# Patient Record
Sex: FEMALE | Race: BLACK OR AFRICAN AMERICAN | NOT HISPANIC OR LATINO | URBAN - METROPOLITAN AREA
[De-identification: names, ages, dates, MRNs, and addresses within clinical notes are randomized per-mention and may not be internally consistent; named-entity substitution may affect disease eponyms.]

---

## 2017-03-22 ENCOUNTER — INPATIENT (INPATIENT)
Facility: HOSPITAL | Age: 22
LOS: 1 days | Discharge: HOME | End: 2017-03-24
Attending: INTERNAL MEDICINE | Admitting: INTERNAL MEDICINE
Payer: COMMERCIAL

## 2017-03-22 VITALS
HEART RATE: 72 BPM | DIASTOLIC BLOOD PRESSURE: 72 MMHG | RESPIRATION RATE: 16 BRPM | SYSTOLIC BLOOD PRESSURE: 152 MMHG | TEMPERATURE: 98 F | OXYGEN SATURATION: 100 %

## 2017-03-22 LAB
ALBUMIN SERPL ELPH-MCNC: 3.8 G/DL — SIGNIFICANT CHANGE UP (ref 3.3–5)
ALP SERPL-CCNC: 57 U/L — SIGNIFICANT CHANGE UP (ref 40–120)
ALT FLD-CCNC: 11 U/L — SIGNIFICANT CHANGE UP (ref 4–33)
AST SERPL-CCNC: 13 U/L — SIGNIFICANT CHANGE UP (ref 4–32)
BASOPHILS # BLD AUTO: 0.02 K/UL — SIGNIFICANT CHANGE UP (ref 0–0.2)
BASOPHILS NFR BLD AUTO: 0.3 % — SIGNIFICANT CHANGE UP (ref 0–2)
BILIRUB SERPL-MCNC: 0.5 MG/DL — SIGNIFICANT CHANGE UP (ref 0.2–1.2)
BUN SERPL-MCNC: 8 MG/DL — SIGNIFICANT CHANGE UP (ref 7–23)
C4 SERPL-MCNC: 23.1 MG/DL — SIGNIFICANT CHANGE UP (ref 10–40)
CALCIUM SERPL-MCNC: 8.9 MG/DL — SIGNIFICANT CHANGE UP (ref 8.4–10.5)
CHLORIDE SERPL-SCNC: 109 MMOL/L — HIGH (ref 98–107)
CO2 SERPL-SCNC: 20 MMOL/L — LOW (ref 22–31)
CREAT SERPL-MCNC: 0.62 MG/DL — SIGNIFICANT CHANGE UP (ref 0.5–1.3)
CRP SERPL-MCNC: 11.5 MG/L — HIGH (ref 0.3–5)
EOSINOPHIL # BLD AUTO: 0.01 K/UL — SIGNIFICANT CHANGE UP (ref 0–0.5)
EOSINOPHIL NFR BLD AUTO: 0.2 % — SIGNIFICANT CHANGE UP (ref 0–6)
ERYTHROCYTE [SEDIMENTATION RATE] IN BLOOD: 26 MM/HR — HIGH (ref 4–25)
GLUCOSE SERPL-MCNC: 87 MG/DL — SIGNIFICANT CHANGE UP (ref 70–99)
HBA1C BLD-MCNC: 5.8 % — HIGH (ref 4–5.6)
HCG SERPL-ACNC: < 5 MIU/ML — SIGNIFICANT CHANGE UP
HCT VFR BLD CALC: 34.3 % — LOW (ref 34.5–45)
HGB BLD-MCNC: 11.6 G/DL — SIGNIFICANT CHANGE UP (ref 11.5–15.5)
IMM GRANULOCYTES NFR BLD AUTO: 0 % — SIGNIFICANT CHANGE UP (ref 0–1.5)
LYMPHOCYTES # BLD AUTO: 0.71 K/UL — LOW (ref 1–3.3)
LYMPHOCYTES # BLD AUTO: 11.6 % — LOW (ref 13–44)
MCHC RBC-ENTMCNC: 28 PG — SIGNIFICANT CHANGE UP (ref 27–34)
MCHC RBC-ENTMCNC: 33.8 % — SIGNIFICANT CHANGE UP (ref 32–36)
MCV RBC AUTO: 82.7 FL — SIGNIFICANT CHANGE UP (ref 80–100)
MONOCYTES # BLD AUTO: 0.07 K/UL — SIGNIFICANT CHANGE UP (ref 0–0.9)
MONOCYTES NFR BLD AUTO: 1.1 % — LOW (ref 2–14)
NEUTROPHILS # BLD AUTO: 5.3 K/UL — SIGNIFICANT CHANGE UP (ref 1.8–7.4)
NEUTROPHILS NFR BLD AUTO: 86.8 % — HIGH (ref 43–77)
PLATELET # BLD AUTO: 230 K/UL — SIGNIFICANT CHANGE UP (ref 150–400)
PMV BLD: 10.8 FL — SIGNIFICANT CHANGE UP (ref 7–13)
POTASSIUM SERPL-MCNC: 3.8 MMOL/L — SIGNIFICANT CHANGE UP (ref 3.5–5.3)
POTASSIUM SERPL-SCNC: 3.8 MMOL/L — SIGNIFICANT CHANGE UP (ref 3.5–5.3)
PROT SERPL-MCNC: 7.3 G/DL — SIGNIFICANT CHANGE UP (ref 6–8.3)
RBC # BLD: 4.15 M/UL — SIGNIFICANT CHANGE UP (ref 3.8–5.2)
RBC # FLD: 13.9 % — SIGNIFICANT CHANGE UP (ref 10.3–14.5)
SODIUM SERPL-SCNC: 144 MMOL/L — SIGNIFICANT CHANGE UP (ref 135–145)
WBC # BLD: 6.11 K/UL — SIGNIFICANT CHANGE UP (ref 3.8–10.5)
WBC # FLD AUTO: 6.11 K/UL — SIGNIFICANT CHANGE UP (ref 3.8–10.5)

## 2017-03-22 RX ORDER — FAMOTIDINE 10 MG/ML
20 INJECTION INTRAVENOUS ONCE
Qty: 0 | Refills: 0 | Status: COMPLETED | OUTPATIENT
Start: 2017-03-22 | End: 2017-03-22

## 2017-03-22 RX ORDER — DIPHENHYDRAMINE HCL 50 MG
25 CAPSULE ORAL EVERY 6 HOURS
Qty: 0 | Refills: 0 | Status: DISCONTINUED | OUTPATIENT
Start: 2017-03-22 | End: 2017-03-23

## 2017-03-22 RX ORDER — SODIUM CHLORIDE 9 MG/ML
1000 INJECTION INTRAMUSCULAR; INTRAVENOUS; SUBCUTANEOUS ONCE
Qty: 0 | Refills: 0 | Status: COMPLETED | OUTPATIENT
Start: 2017-03-22 | End: 2017-03-22

## 2017-03-22 RX ORDER — DIPHENHYDRAMINE HCL 50 MG
25 CAPSULE ORAL ONCE
Qty: 0 | Refills: 0 | Status: COMPLETED | OUTPATIENT
Start: 2017-03-22 | End: 2017-03-22

## 2017-03-22 RX ORDER — DEXAMETHASONE 0.5 MG/5ML
10 ELIXIR ORAL ONCE
Qty: 0 | Refills: 0 | Status: COMPLETED | OUTPATIENT
Start: 2017-03-22 | End: 2017-03-22

## 2017-03-22 RX ORDER — FAMOTIDINE 10 MG/ML
20 INJECTION INTRAVENOUS
Qty: 0 | Refills: 0 | Status: DISCONTINUED | OUTPATIENT
Start: 2017-03-22 | End: 2017-03-24

## 2017-03-22 RX ORDER — SODIUM CHLORIDE 9 MG/ML
1000 INJECTION INTRAMUSCULAR; INTRAVENOUS; SUBCUTANEOUS
Qty: 0 | Refills: 0 | Status: DISCONTINUED | OUTPATIENT
Start: 2017-03-22 | End: 2017-03-24

## 2017-03-22 RX ADMIN — FAMOTIDINE 20 MILLIGRAM(S): 10 INJECTION INTRAVENOUS at 15:53

## 2017-03-22 RX ADMIN — Medication 25 MILLIGRAM(S): at 15:53

## 2017-03-22 RX ADMIN — Medication 125 MILLIGRAM(S): at 15:53

## 2017-03-22 RX ADMIN — SODIUM CHLORIDE 1000 MILLILITER(S): 9 INJECTION INTRAMUSCULAR; INTRAVENOUS; SUBCUTANEOUS at 17:37

## 2017-03-22 NOTE — ED PROVIDER NOTE - ATTENDING CONTRIBUTION TO CARE
DR. HARDWICK, ATTENDING MD-  I performed a face to face bedside interview with patient regarding history of present illness, review of symptoms and past medical history. I completed an independent physical exam.  I have discussed patient's plan of care with the resident.   Documentation as above in the note.    my exam: nad.  tongue swollen but not elevated and I am able to visualize posterior oropharynx and pt able to phonate well.  no stridor DR. HARDWICK, ATTENDING MD-  I performed a face to face bedside interview with patient regarding history of present illness, review of symptoms and past medical history. I completed an independent physical exam.  I have discussed patient's plan of care with the resident.   Documentation as above in the note..    my exam: nad.  tongue swollen but not elevated and I am able to visualize posterior oropharynx and pt able to phonate well.  no stridor DR. HARDWICK, ATTENDING MD-  I performed a face to face bedside interview with patient regarding history of present illness, review of symptoms and past medical history. I completed an independent physical exam.  I have discussed patient's plan of care with the resident.   Documentation as above in the note...    my exam: nad.  tongue swollen but not elevated and I am able to visualize posterior oropharynx and pt able to phonate well.  no stridor

## 2017-03-22 NOTE — ED PROVIDER NOTE - PROGRESS NOTE DETAILS
tay: I evaluated the pt several times since 4pm, and there is no improvement in the tongue swelling. ot has diff swallowing. She is not in any respiratory distress. rr 14-16 and nonstridorous.   as there has not been any improvement, will request cdu consult and transfer for further observation and care. tay: at request of CDU provider, before acceptance, ENT consult requested. tay: at request of CDU provider, before acceptance, ENT consult requested..

## 2017-03-22 NOTE — ED CDU PROVIDER NOTE - DETAILS
On CDU arrival, examination under tongue reveals piercing that, when gently manipulated, pus easily flowed from left side of piercing (piercing is curved bar with balls that screw on and off on end).  IV antibiotics ordered; ENT Dr. Stroud made aware.

## 2017-03-22 NOTE — ED CDU PROVIDER NOTE - OBJECTIVE STATEMENT
Pt is a 20 yo F, history of carrying trait for PKD (no active disease per Pt), who presents to the ED from urgent care with CC of tongue swelling and soreness. Onset was yesterday, worse today, and Pt is noting difficulty with speaking and swallowing b/c of salivation. She notes some soreness in the tongue, but denies other pain. She denies difficulty breathing. She has no previous history of tongue swelling or similar isolated swelling - and no FHx of angioedema. She is allergic to PCNs, but has not taken any antibiotics recently. No new medications (only med is Depo Provera). She has a tongue piercing (at the base of the tongue) but has had that for years. Denies new foods or exposures. She was treated recently for idiopathic colitis, since resolved, but she has been avoiding nuts and dairy.    CDU WILMA Alejandro Note-----  ED Provider Note reviewed per above; pt is a 20 yo female with PMH of polycystic kidney, who presented to the ED for tongue swelling as noted above.  Pt. was evaluated in the ED and managed for possible allergic reaction; ENT was consulted and pt was scoped; no laryngeal edema found.  Pt. was managed with IV hydration, Benadryl, Pepcid, and sent to CDU for plan to continue meds per orders, IV hydration, and for observation and reassessment.  On CDU arrival, pt stated she felt slight improvement of swelling vs initial ED arrival.  On examination, area under tongue reveals piercing that, when gently manipulated, pus easily flowed from left side of piercing (piercing is curved bar with balls that screw on and off on end).  CT had been ordered to r/o infection, on CDU arrival, pt is pending CT for further evaluation, IV antibiotics ordered; piercing removed (see Procedure Note); ENT Dr. Stroud made aware.

## 2017-03-22 NOTE — ED CDU PROVIDER NOTE - MOUTH
Generalized mild tongue inflammation noted at time of CDU arrival.  Piercing (curved metal bar with metal balls at either end) noted in place in midline under tongue at base.  Manipulation (gentle manual manipulation) of piercing causes pus to easily flow from left side of piercing.  Remainder of oral exam WNL; no uvular edema noted on CDU exam; no oral lesions noted.  Oral structures are symmetrical in appearance.

## 2017-03-22 NOTE — ED ADULT NURSE NOTE - CHIEF COMPLAINT QUOTE
sent by [facility] for tongue swelling, patient states symptoms started yesterday at 12:00 p.m., and progressively worsening, (+) difficulty swallowing saliva.  Difficulty closing mouth completely.  No drooling noted.  Denies any throat discomfort.  Patient has (+) piercing below tongue x 5 years.

## 2017-03-22 NOTE — ED PROVIDER NOTE - OBJECTIVE STATEMENT
Pt is a 22 yo F, history of carrying trait for PKD (no active disease per Pt), who presents to the ED from urgent care with CC of tongue swelling and soreness. Onset was yesterday, worse today, and Pt is noting difficulty with speaking and swallowing b/c of salivation. She notes some soreness in the tongue, but denies other pain. She denies difficulty breathing. She has no previous history of tongue swelling or similar isolated swelling - and no FHx of angioedema. She is allergic to PCNs, but has not taken any antibiotics recently. No new medications (only med is Depo Provera). She has a tongue piercing (at the base of the tongue) but has had that for years. Denies new foods or exposures. She was treated recently for idiopathic colitis, since resolved, but she has been avoiding nuts and dairy.

## 2017-03-22 NOTE — ED CDU PROVIDER NOTE - PROGRESS NOTE DETAILS
CDU WILMA Alejandro Addendum-----  CT results reviewed and discussed with ENT resident.  Pt. verbalizing feeling some improvement in swelling, antibiotics were started and CT official report states no fluid collection.  Pt. objectively appears very comfortable; VSS.  Will monitor for continued improvement.  ENT resident states service will re-eval pt in morning.  Pt resting comfortably at present, O2 sats % on room air.  Will continue to monitor / reassess. WILMA Serna: pt feels improved however ENT would like pt admitted for continued IV Abx. Will contact PMD/hospitalist for admission. WILMA Serna: Spoke with PMD Dr. Ziegler  does not admit to hospital. Will admit to hospitalist. Sourav: Supervising WILMA Serna 3/21/17. Sourav (Physician) CDU ATTENDING D/C NOTE for admission to hospital: Young woman, swollen tongue, removal of tongue ring led to draining pus. CT showing no drainable collection. Scoped by ENT, who recommend IP ABx. Appears well. AFVSS. No stridor. Tongue slightly large. Will admit for IV Abx.

## 2017-03-22 NOTE — ED PROVIDER NOTE - MEDICAL DECISION MAKING DETAILS
Pt is a 22 yo F who presents with isolated tongue swelling, onset yesterday, worsening today. No known new exposures/food/medication. No known trauma, and evaluated the piercing, at base of tongue, which shows no local erythema or drainage. Concern for angioedema (consider also that recent idiopathic colitis could be related), no FHx of angioedema. Given steroids, diphenhydramine and pepcid IV. No additional cutaneous findings on exam. Pt takes Depo, but no estrogens, no ACE-I.  Will continue to monitor closely given proximity to airway though, at this time, no stridor/hoarseness and Pt breathing comfortably.

## 2017-03-22 NOTE — ED CDU PROVIDER NOTE - ATTENDING CONTRIBUTION TO CARE
attest I performed a face-to-face evaluation of the patient and performed a history and physical examination. I agree with the history and physical examination.    Young woman, swollen tongue, removal of tongue ring led to draining pus. CT showing no drainable collection. Scoped by ENT, who recommend IP ABx. Appears well. AFVSS. No stridor. Tongue slightly large. Will admit for IV Abx for tongue abscess/cellulitis. I performed a face-to-face evaluation of the patient and performed a history and physical examination. I agree with the history and physical examination.    Young woman, swollen tongue, removal of tongue ring led to draining pus. CT showing no drainable collection. Scoped by ENT, who recommend IP ABx. Appears well. AFVSS. No stridor. Tongue slightly large. Will admit for IV Abx for tongue abscess/cellulitis..

## 2017-03-22 NOTE — ED CDU PROVIDER NOTE - CONSTITUTIONAL, MLM
normal... Well appearing, well nourished, awake, alert, oriented to person, place, time/situation and in no apparent distress.  Pt. has mild generalized tongue inflammation noted; pt is able to verbalize effortlessly in full, clear sentences.  No dyspnea or stridor noted; pt objectively appears very comfortable.

## 2017-03-22 NOTE — ED ADULT TRIAGE NOTE - CHIEF COMPLAINT QUOTE
sent by [facility] for tongue swelling, patient states symptoms started yesterday at 12:00 p.m., and progressively worsening, (+) difficulty swallowing saliva.  Difficulty closing mouth completely.  No drooling noted.  Denies any throat discomfort.  Patient has (+) piercing below tongue x 5 years.

## 2017-03-22 NOTE — ED PROVIDER NOTE - SHIFT CHANGE DETAILS
given steroids/benadryl/pepcid.  Will be observed, if no significant improvement may need overnight CDU observation.

## 2017-03-22 NOTE — ED ADULT NURSE NOTE - OBJECTIVE STATEMENT
20 y/o female presents to ED with c/o tongue swelling.  Pt states that the swelling started yesterday, pt denies eating or drinking anything different than usual, states that her tongue just started to feel funny when she was talking.  Pt states that she was able to sleep and awoke this morning with some worsening of the swelling and states that her tongue felt irritated.  Pt denies fever or chills, denies cough or sore throat, pt able to speak denies any difficulty breathing.  Pt states that she has a piercing under her tongue that she has had for 5 years.  Pt offers no other complaints

## 2017-03-22 NOTE — ED PROVIDER NOTE - THROAT FINDINGS
NO DROOLING/HOARSE/MUFFLED VOICE/NO STRIDOR/swollen tongue, some geography (conformed to dentition at edges)

## 2017-03-23 DIAGNOSIS — K14.0 GLOSSITIS: ICD-10-CM

## 2017-03-23 DIAGNOSIS — N63 UNSPECIFIED LUMP IN BREAST: Chronic | ICD-10-CM

## 2017-03-23 PROCEDURE — 99223 1ST HOSP IP/OBS HIGH 75: CPT

## 2017-03-23 PROCEDURE — 70491 CT SOFT TISSUE NECK W/DYE: CPT | Mod: 26

## 2017-03-23 RX ORDER — MEDROXYPROGESTERONE ACETATE 150 MG/ML
0 INJECTION, SUSPENSION, EXTENDED RELEASE INTRAMUSCULAR
Qty: 0 | Refills: 0 | COMMUNITY

## 2017-03-23 RX ORDER — DEXAMETHASONE 0.5 MG/5ML
10 ELIXIR ORAL
Qty: 0 | Refills: 0 | Status: COMPLETED | OUTPATIENT
Start: 2017-03-23 | End: 2017-03-23

## 2017-03-23 RX ORDER — INFLUENZA VIRUS VACCINE 15; 15; 15; 15 UG/.5ML; UG/.5ML; UG/.5ML; UG/.5ML
0.5 SUSPENSION INTRAMUSCULAR ONCE
Qty: 0 | Refills: 0 | Status: DISCONTINUED | OUTPATIENT
Start: 2017-03-23 | End: 2017-03-24

## 2017-03-23 RX ORDER — ACETAMINOPHEN 500 MG
650 TABLET ORAL EVERY 6 HOURS
Qty: 0 | Refills: 0 | Status: DISCONTINUED | OUTPATIENT
Start: 2017-03-23 | End: 2017-03-24

## 2017-03-23 RX ORDER — DIPHENHYDRAMINE HCL 50 MG
50 CAPSULE ORAL
Qty: 0 | Refills: 0 | Status: DISCONTINUED | OUTPATIENT
Start: 2017-03-23 | End: 2017-03-24

## 2017-03-23 RX ORDER — KETOROLAC TROMETHAMINE 30 MG/ML
30 SYRINGE (ML) INJECTION ONCE
Qty: 0 | Refills: 0 | Status: DISCONTINUED | OUTPATIENT
Start: 2017-03-23 | End: 2017-03-23

## 2017-03-23 RX ADMIN — Medication 30 MILLIGRAM(S): at 00:45

## 2017-03-23 RX ADMIN — Medication 25 MILLIGRAM(S): at 00:32

## 2017-03-23 RX ADMIN — Medication 25 MILLIGRAM(S): at 12:17

## 2017-03-23 RX ADMIN — SODIUM CHLORIDE 150 MILLILITER(S): 9 INJECTION INTRAMUSCULAR; INTRAVENOUS; SUBCUTANEOUS at 05:46

## 2017-03-23 RX ADMIN — FAMOTIDINE 20 MILLIGRAM(S): 10 INJECTION INTRAVENOUS at 05:47

## 2017-03-23 RX ADMIN — Medication 100 MILLIGRAM(S): at 02:04

## 2017-03-23 RX ADMIN — FAMOTIDINE 20 MILLIGRAM(S): 10 INJECTION INTRAVENOUS at 17:46

## 2017-03-23 RX ADMIN — Medication 100 MILLIGRAM(S): at 17:46

## 2017-03-23 RX ADMIN — SODIUM CHLORIDE 150 MILLILITER(S): 9 INJECTION INTRAMUSCULAR; INTRAVENOUS; SUBCUTANEOUS at 21:12

## 2017-03-23 RX ADMIN — Medication 100 MILLIGRAM(S): at 10:26

## 2017-03-23 RX ADMIN — Medication 100 MILLIGRAM(S): at 21:12

## 2017-03-23 RX ADMIN — Medication 30 MILLIGRAM(S): at 00:29

## 2017-03-23 RX ADMIN — SODIUM CHLORIDE 150 MILLILITER(S): 9 INJECTION INTRAMUSCULAR; INTRAVENOUS; SUBCUTANEOUS at 00:29

## 2017-03-23 RX ADMIN — Medication 102 MILLIGRAM(S): at 09:40

## 2017-03-23 RX ADMIN — SODIUM CHLORIDE 150 MILLILITER(S): 9 INJECTION INTRAMUSCULAR; INTRAVENOUS; SUBCUTANEOUS at 13:57

## 2017-03-23 RX ADMIN — Medication 25 MILLIGRAM(S): at 05:46

## 2017-03-23 RX ADMIN — Medication 50 MILLIGRAM(S): at 17:46

## 2017-03-23 RX ADMIN — Medication 102 MILLIGRAM(S): at 17:46

## 2017-03-23 RX ADMIN — Medication 102 MILLIGRAM(S): at 01:24

## 2017-03-23 NOTE — H&P ADULT. - HISTORY OF PRESENT ILLNESS
22 yo F w/ h/o PCKD trait (no active disease) presents with 2 days of tongue swelling and soreness. Pt states that 2 days ago she began feeling a funny sensation in her tongue, and decided to go to urgent care yesterday. She was told to come to the ED when she arrived at urgent care. Pt denies any SOB. No previous h/o tongue swelling. No new foods or exposures. No FH of angioedema. Pt has a tongue ring which was removed in the CDU, and noted to have surrounding pus. Pt was scoped by ENT who recommended admission for IV abx.     In the ED, afebrile. VSS.  Pt started on Clindamycin IV, decadron, benadryl, and pepcid.  Labs unremarkable.  CT neck - diffuse swelling of tongue

## 2017-03-24 ENCOUNTER — TRANSCRIPTION ENCOUNTER (OUTPATIENT)
Age: 22
End: 2017-03-24

## 2017-03-24 VITALS
HEART RATE: 80 BPM | TEMPERATURE: 98 F | OXYGEN SATURATION: 99 % | DIASTOLIC BLOOD PRESSURE: 71 MMHG | SYSTOLIC BLOOD PRESSURE: 123 MMHG | RESPIRATION RATE: 18 BRPM

## 2017-03-24 LAB
BASOPHILS # BLD AUTO: 0 K/UL — SIGNIFICANT CHANGE UP (ref 0–0.2)
BASOPHILS NFR BLD AUTO: 0 % — SIGNIFICANT CHANGE UP (ref 0–2)
BUN SERPL-MCNC: 11 MG/DL — SIGNIFICANT CHANGE UP (ref 7–23)
C TRACH RRNA SPEC QL NAA+PROBE: SIGNIFICANT CHANGE UP
CALCIUM SERPL-MCNC: 8.3 MG/DL — LOW (ref 8.4–10.5)
CHLORIDE SERPL-SCNC: 110 MMOL/L — HIGH (ref 98–107)
CO2 SERPL-SCNC: 19 MMOL/L — LOW (ref 22–31)
CREAT SERPL-MCNC: 0.51 MG/DL — SIGNIFICANT CHANGE UP (ref 0.5–1.3)
EOSINOPHIL # BLD AUTO: 0 K/UL — SIGNIFICANT CHANGE UP (ref 0–0.5)
EOSINOPHIL NFR BLD AUTO: 0 % — SIGNIFICANT CHANGE UP (ref 0–6)
GLUCOSE SERPL-MCNC: 122 MG/DL — HIGH (ref 70–99)
HCT VFR BLD CALC: 29.7 % — LOW (ref 34.5–45)
HGB BLD-MCNC: 9.9 G/DL — LOW (ref 11.5–15.5)
HIV1 AG SER QL: SIGNIFICANT CHANGE UP
HIV1+2 AB SPEC QL: SIGNIFICANT CHANGE UP
IMM GRANULOCYTES NFR BLD AUTO: 0.1 % — SIGNIFICANT CHANGE UP (ref 0–1.5)
LYMPHOCYTES # BLD AUTO: 1.27 K/UL — SIGNIFICANT CHANGE UP (ref 1–3.3)
LYMPHOCYTES # BLD AUTO: 16.4 % — SIGNIFICANT CHANGE UP (ref 13–44)
MAGNESIUM SERPL-MCNC: 2.1 MG/DL — SIGNIFICANT CHANGE UP (ref 1.6–2.6)
MCHC RBC-ENTMCNC: 27.3 PG — SIGNIFICANT CHANGE UP (ref 27–34)
MCHC RBC-ENTMCNC: 33.3 % — SIGNIFICANT CHANGE UP (ref 32–36)
MCV RBC AUTO: 81.8 FL — SIGNIFICANT CHANGE UP (ref 80–100)
MONOCYTES # BLD AUTO: 0.63 K/UL — SIGNIFICANT CHANGE UP (ref 0–0.9)
MONOCYTES NFR BLD AUTO: 8.1 % — SIGNIFICANT CHANGE UP (ref 2–14)
N GONORRHOEA RRNA SPEC QL NAA+PROBE: SIGNIFICANT CHANGE UP
NEUTROPHILS # BLD AUTO: 5.83 K/UL — SIGNIFICANT CHANGE UP (ref 1.8–7.4)
NEUTROPHILS NFR BLD AUTO: 75.4 % — SIGNIFICANT CHANGE UP (ref 43–77)
PHOSPHATE SERPL-MCNC: 3.1 MG/DL — SIGNIFICANT CHANGE UP (ref 2.5–4.5)
PLATELET # BLD AUTO: 250 K/UL — SIGNIFICANT CHANGE UP (ref 150–400)
PMV BLD: 11.3 FL — SIGNIFICANT CHANGE UP (ref 7–13)
POTASSIUM SERPL-MCNC: 3.8 MMOL/L — SIGNIFICANT CHANGE UP (ref 3.5–5.3)
POTASSIUM SERPL-SCNC: 3.8 MMOL/L — SIGNIFICANT CHANGE UP (ref 3.5–5.3)
RBC # BLD: 3.63 M/UL — LOW (ref 3.8–5.2)
RBC # FLD: 14.4 % — SIGNIFICANT CHANGE UP (ref 10.3–14.5)
SODIUM SERPL-SCNC: 141 MMOL/L — SIGNIFICANT CHANGE UP (ref 135–145)
SPECIMEN SOURCE: SIGNIFICANT CHANGE UP
T PALLIDUM AB TITR SER: NEGATIVE — SIGNIFICANT CHANGE UP
WBC # BLD: 7.74 K/UL — SIGNIFICANT CHANGE UP (ref 3.8–10.5)
WBC # FLD AUTO: 7.74 K/UL — SIGNIFICANT CHANGE UP (ref 3.8–10.5)

## 2017-03-24 RX ORDER — DIPHENHYDRAMINE HCL 50 MG
1 CAPSULE ORAL
Qty: 0 | Refills: 0 | COMMUNITY
Start: 2017-03-24

## 2017-03-24 RX ORDER — ACETAMINOPHEN 500 MG
2 TABLET ORAL
Qty: 0 | Refills: 0 | COMMUNITY
Start: 2017-03-24

## 2017-03-24 RX ORDER — LACTOBACILLUS ACIDOPHILUS 100MM CELL
1 CAPSULE ORAL
Qty: 0 | Refills: 0 | COMMUNITY

## 2017-03-24 RX ADMIN — Medication 100 MILLIGRAM(S): at 05:02

## 2017-03-24 RX ADMIN — SODIUM CHLORIDE 150 MILLILITER(S): 9 INJECTION INTRAMUSCULAR; INTRAVENOUS; SUBCUTANEOUS at 05:02

## 2017-03-24 RX ADMIN — FAMOTIDINE 20 MILLIGRAM(S): 10 INJECTION INTRAVENOUS at 17:14

## 2017-03-24 RX ADMIN — Medication 100 MILLIGRAM(S): at 13:15

## 2017-03-24 RX ADMIN — FAMOTIDINE 20 MILLIGRAM(S): 10 INJECTION INTRAVENOUS at 05:01

## 2017-03-24 RX ADMIN — Medication 50 MILLIGRAM(S): at 17:14

## 2017-03-24 RX ADMIN — Medication 50 MILLIGRAM(S): at 05:02

## 2017-03-24 NOTE — DISCHARGE NOTE ADULT - CARE PLAN
Principal Discharge DX:	Glossitis  Goal:	Controlled  Instructions for follow-up, activity and diet:	You were seen by ENT specialist and your cat scan of neck was negative for fluid collection.    Recommendation to continue Clindamycin 450 mg 3x daily for 7 days.   You will need to take a probiotic daily with antibiotic therapy.  Follow up with your PCP within 1 week of discharge.  Secondary Diagnosis:	Polycystic kidney, unspecified  Goal:	Controlled  Instructions for follow-up, activity and diet:	Follow up with your GYN as an outpatient.

## 2017-03-24 NOTE — DISCHARGE NOTE ADULT - CARE PROVIDERS DIRECT ADDRESSES
[email or web address]

## 2017-03-24 NOTE — DISCHARGE NOTE ADULT - MEDICATION SUMMARY - MEDICATIONS TO TAKE
I will START or STAY ON the medications listed below when I get home from the hospital:    acetaminophen 325 mg oral tablet  -- 2 tab(s) by mouth every 6 hours, As needed, Moderate Pain (4 - 6)  -- Indication: For pain management    diphenhydrAMINE 50 mg oral capsule  -- 1 cap(s) by mouth 2 times a day  -- Indication: For prophylactic    Depo-Provera  --  intramuscular injection, every 3 months  -- Indication: For polycycstic ovaries    clindamycin 150 mg oral capsule  -- 3 cap(s) by mouth every 8 hours  -- Finish all this medication unless otherwise directed by prescriber.  Medication should be taken with plenty of water.    -- Indication: For Antibiotic    Acidophilus oral capsule  -- 1 cap(s) by mouth 2 times a day  -- Indication: For prophylactic

## 2017-03-24 NOTE — DISCHARGE NOTE ADULT - PLAN OF CARE
Controlled You were seen by ENT specialist and your cat scan of neck was negative for fluid collection.    Recommendation to continue Clindamycin 450 mg 3x daily for 7 days.   You will need to take a probiotic daily with antibiotic therapy.  Follow up with your PCP within 1 week of discharge. Follow up with your GYN as an outpatient.

## 2017-03-24 NOTE — DISCHARGE NOTE ADULT - HOSPITAL COURSE
22 yo F w/ h/o PCKD trait (no active disease) presents with 2 days of tongue swelling and soreness. Pt states that 2 days ago she began feeling a funny sensation in her tongue, and decided to go to urgent care yesterday. She was told to come to the ED when she arrived at urgent care. Pt denies any SOB. No previous h/o tongue swelling. No new foods or exposures. No FH of angioedema. Pt has a tongue ring which was removed in the CDU, and noted to have surrounding pus. Pt was scoped by ENT who recommended admission for IV abx.     Hospital Course:    22 yo F w/ PCKD trait (no active disease) p/w tongue swelling and soreness, s/p removal of tongue ring which was noted to have pus, admitted for glossitis    1) Glossitis - appreciate ENT f/u, c/w Clindamycin, Benadryl, Pepcid, Decadron (has 1 more dose)     Dispo-home with Antibiotic therapy.   Follow up with PCP within 1 week of discharge.

## 2017-03-24 NOTE — DISCHARGE NOTE ADULT - PATIENT PORTAL LINK FT
“You can access the FollowHealth Patient Portal, offered by [facility], by registering with the following website: [email or web address]”

## 2017-03-31 ENCOUNTER — APPOINTMENT (OUTPATIENT)
Dept: INTERNAL MEDICINE | Facility: CLINIC | Age: 22
End: 2017-03-31

## 2017-03-31 VITALS
SYSTOLIC BLOOD PRESSURE: 134 MMHG | OXYGEN SATURATION: 99 % | WEIGHT: 200 LBS | TEMPERATURE: 99 F | HEART RATE: 93 BPM | HEIGHT: 68 IN | DIASTOLIC BLOOD PRESSURE: 80 MMHG | BODY MASS INDEX: 30.31 KG/M2 | RESPIRATION RATE: 16 BRPM

## 2017-03-31 DIAGNOSIS — Z86.2 PERSONAL HISTORY OF DISEASES OF THE BLOOD AND BLOOD-FORMING ORGANS AND CERTAIN DISORDERS INVOLVING THE IMMUNE MECHANISM: ICD-10-CM

## 2017-03-31 DIAGNOSIS — K62.5 HEMORRHAGE OF ANUS AND RECTUM: ICD-10-CM

## 2017-03-31 DIAGNOSIS — K51.00 ULCERATIVE (CHRONIC) PANCOLITIS W/OUT COMPLICATIONS: ICD-10-CM

## 2017-03-31 DIAGNOSIS — Q61.3 POLYCYSTIC KIDNEY, UNSPECIFIED: ICD-10-CM

## 2017-03-31 RX ORDER — LORATADINE 10 MG
TABLET ORAL
Refills: 0 | Status: ACTIVE | COMMUNITY

## 2017-03-31 RX ORDER — EPINEPHRINE 0.3 MG/.3ML
0.3 INJECTION INTRAMUSCULAR
Qty: 1 | Refills: 5 | Status: ACTIVE | COMMUNITY
Start: 2017-03-31 | End: 1900-01-01

## 2017-04-17 ENCOUNTER — APPOINTMENT (OUTPATIENT)
Dept: ALLERGY | Facility: CLINIC | Age: 22
End: 2017-04-17

## 2017-04-17 VITALS
HEART RATE: 80 BPM | RESPIRATION RATE: 14 BRPM | SYSTOLIC BLOOD PRESSURE: 156 MMHG | HEIGHT: 68 IN | DIASTOLIC BLOOD PRESSURE: 80 MMHG | BODY MASS INDEX: 30.31 KG/M2 | WEIGHT: 200 LBS

## 2017-04-17 RX ORDER — CLINDAMYCIN HYDROCHLORIDE 150 MG/1
150 CAPSULE ORAL
Refills: 0 | Status: DISCONTINUED | COMMUNITY
End: 2017-04-17

## 2017-08-21 ENCOUNTER — APPOINTMENT (OUTPATIENT)
Dept: INTERNAL MEDICINE | Facility: CLINIC | Age: 22
End: 2017-08-21
Payer: COMMERCIAL

## 2017-08-21 VITALS
RESPIRATION RATE: 16 BRPM | BODY MASS INDEX: 30.92 KG/M2 | HEART RATE: 93 BPM | OXYGEN SATURATION: 99 % | DIASTOLIC BLOOD PRESSURE: 60 MMHG | WEIGHT: 204 LBS | TEMPERATURE: 98.4 F | SYSTOLIC BLOOD PRESSURE: 120 MMHG | HEIGHT: 68 IN

## 2017-08-21 DIAGNOSIS — Z91.018 ALLERGY TO OTHER FOODS: ICD-10-CM

## 2017-08-21 DIAGNOSIS — Z80.51 FAMILY HISTORY OF MALIGNANT NEOPLASM OF KIDNEY: ICD-10-CM

## 2017-08-21 DIAGNOSIS — R93.5 ABNORMAL FINDINGS ON DIAGNOSTIC IMAGING OF OTHER ABDOMINAL REGIONS, INCLUDING RETROPERITONEUM: ICD-10-CM

## 2017-08-21 DIAGNOSIS — E55.9 VITAMIN D DEFICIENCY, UNSPECIFIED: ICD-10-CM

## 2017-08-21 DIAGNOSIS — N60.01 SOLITARY CYST OF RIGHT BREAST: ICD-10-CM

## 2017-08-21 DIAGNOSIS — N60.02 SOLITARY CYST OF RIGHT BREAST: ICD-10-CM

## 2017-08-21 DIAGNOSIS — L03.90 CELLULITIS, UNSPECIFIED: ICD-10-CM

## 2017-08-21 DIAGNOSIS — Z87.19 PERSONAL HISTORY OF OTHER DISEASES OF THE DIGESTIVE SYSTEM: ICD-10-CM

## 2017-08-21 DIAGNOSIS — E66.9 OBESITY, UNSPECIFIED: ICD-10-CM

## 2017-08-21 DIAGNOSIS — Z00.00 ENCOUNTER FOR GENERAL ADULT MEDICAL EXAMINATION W/OUT ABNORMAL FINDINGS: ICD-10-CM

## 2017-08-21 DIAGNOSIS — Z11.1 ENCOUNTER FOR SCREENING FOR RESPIRATORY TUBERCULOSIS: ICD-10-CM

## 2017-08-21 DIAGNOSIS — R73.03 PREDIABETES.: ICD-10-CM

## 2017-08-21 DIAGNOSIS — F17.200 NICOTINE DEPENDENCE, UNSPECIFIED, UNCOMPLICATED: ICD-10-CM

## 2017-08-21 DIAGNOSIS — N28.1 CYST OF KIDNEY, ACQUIRED: ICD-10-CM

## 2017-08-21 PROCEDURE — 36415 COLL VENOUS BLD VENIPUNCTURE: CPT

## 2017-08-21 PROCEDURE — 99395 PREV VISIT EST AGE 18-39: CPT | Mod: 25

## 2017-08-22 RX ORDER — DOXYCYCLINE 100 MG/1
100 TABLET, FILM COATED ORAL
Qty: 1 | Refills: 0 | Status: ACTIVE | COMMUNITY
Start: 2017-08-21 | End: 1900-01-01

## 2017-08-23 LAB
25(OH)D3 SERPL-MCNC: 20.9 NG/ML
ALBUMIN SERPL ELPH-MCNC: 4.2 G/DL
ALP BLD-CCNC: 70 U/L
ALT SERPL-CCNC: 17 U/L
ANION GAP SERPL CALC-SCNC: 15 MMOL/L
AST SERPL-CCNC: 13 U/L
BASOPHILS # BLD AUTO: 0.02 K/UL
BASOPHILS NFR BLD AUTO: 0.2 %
BILIRUB SERPL-MCNC: 0.4 MG/DL
BUN SERPL-MCNC: 7 MG/DL
CALCIUM SERPL-MCNC: 9.2 MG/DL
CHLORIDE SERPL-SCNC: 106 MMOL/L
CHOLEST SERPL-MCNC: 138 MG/DL
CHOLEST/HDLC SERPL: 2.3 RATIO
CO2 SERPL-SCNC: 22 MMOL/L
CREAT SERPL-MCNC: 0.62 MG/DL
EOSINOPHIL # BLD AUTO: 0.02 K/UL
EOSINOPHIL NFR BLD AUTO: 0.2 %
GLUCOSE SERPL-MCNC: 97 MG/DL
HBA1C MFR BLD HPLC: 5.3 %
HCT VFR BLD CALC: 37.2 %
HDLC SERPL-MCNC: 60 MG/DL
HGB BLD-MCNC: 12.3 G/DL
IMM GRANULOCYTES NFR BLD AUTO: 0.1 %
LDLC SERPL CALC-MCNC: 68 MG/DL
LYMPHOCYTES # BLD AUTO: 1.46 K/UL
LYMPHOCYTES NFR BLD AUTO: 17.7 %
MAN DIFF?: NORMAL
MCHC RBC-ENTMCNC: 27.5 PG
MCHC RBC-ENTMCNC: 33.1 GM/DL
MCV RBC AUTO: 83.2 FL
MONOCYTES # BLD AUTO: 0.41 K/UL
MONOCYTES NFR BLD AUTO: 5 %
NEUTROPHILS # BLD AUTO: 6.31 K/UL
NEUTROPHILS NFR BLD AUTO: 76.8 %
PLATELET # BLD AUTO: 264 K/UL
POTASSIUM SERPL-SCNC: 4.2 MMOL/L
PROT SERPL-MCNC: 7.3 G/DL
RBC # BLD: 4.47 M/UL
RBC # FLD: 14.3 %
SODIUM SERPL-SCNC: 143 MMOL/L
TRIGL SERPL-MCNC: 48 MG/DL
TSH SERPL-ACNC: 0.81 UIU/ML
WBC # FLD AUTO: 8.23 K/UL

## 2017-08-23 RX ORDER — ERGOCALCIFEROL 1.25 MG/1
1.25 MG CAPSULE, LIQUID FILLED ORAL
Qty: 8 | Refills: 0 | Status: ACTIVE | COMMUNITY
Start: 2017-08-23 | End: 1900-01-01

## 2017-08-24 LAB
ADJUSTED MITOGEN: >10 IU/ML
ADJUSTED TB AG: 0 IU/ML
M TB IFN-G BLD-IMP: NEGATIVE
QUANTIFERON GOLD NIL: 0.03 IU/ML

## 2017-11-20 PROBLEM — Z86.010 HISTORY OF COLON POLYPS: Status: RESOLVED | Noted: 2017-11-20 | Resolved: 2017-11-20

## 2017-11-21 ENCOUNTER — APPOINTMENT (OUTPATIENT)
Dept: INTERNAL MEDICINE | Facility: CLINIC | Age: 22
End: 2017-11-21

## 2017-11-21 DIAGNOSIS — Z86.010 PERSONAL HISTORY OF COLONIC POLYPS: ICD-10-CM

## 2018-10-26 ENCOUNTER — APPOINTMENT (OUTPATIENT)
Dept: INTERNAL MEDICINE | Facility: CLINIC | Age: 23
End: 2018-10-26

## 2022-03-30 NOTE — H&P ADULT. - DOCUMENT STATUS
[facility] Scheduling    Please call [phone] to schedule your image(s) (select option#1). There are 2 different locations, see below. You can do walk-in visits for xray only images if you want.     [facility]  [address] [id number]      [facility]  [address] [address]    I am checking an x-ray of your thoracic spine to make sure there is no new fracture, especially given your history of multiple compression fractures.    You do have scoliosis in your spine which puts more stress on the cartilage disks and joints in your back.    Your hip pain today seems most consistent with trochanteric bursitis.    Physical therapy should be very helpful for both your hip and your back pain.  If physical therapy does not provide adequate relief we could consider cortisone shots for the hip pain.  We could also consider injections for the back pain, but we would need to obtain MRI scans first.  
Authored by Attending

## 2022-12-07 NOTE — H&P ADULT. - ASSESSMENT
Statement Selected
20 yo F w/ PCKD trait (no active disease) p/w tongue swelling and soreness, s/p removal of tongue ring which was noted to have pus, admitted for glossitis    1) Glossitis - appreciate ENT f/u, c/w Clindamycin, Benadryl, Pepcid, Decadron (has 1 more dose)     2) DVT ppx - not needed as young and ambulatory    Likely can be d/c'ed tomorrow if remains clinically stable and cleared by ENT

## 2023-07-11 NOTE — ED CDU PROVIDER NOTE - MEDICAL DECISION MAKING DETAILS
Initial concern was suspect allergic reaction, plan was to continue meds per orders, IV hydration, observation and reassessment.  On CDU arrival, examination under tongue reveals piercing that, when gently manipulated, pus easily flowed from left side of piercing (piercing is curved bar with balls that screw on and off on end).  Pt pending CT for further evaluation, IV antibiotics ordered; ENT Dr. Stroud made aware.
Formula

## 2023-10-05 NOTE — ED ADULT NURSE NOTE - NS ED NURSE RECORD ANOTHER VITAL SIGN
Patient denies any angina or angina:. Nuclear stress test done on 6/1/2022 reviewed no perfusion defects were noted. Relevant medications includes amlodipine 10 mg daily, regular thiazide 12.5 mg daily, losartan 50 mg daily, metoprolol succinate 100 mg daily, atorvastatin 10 mg daily, as well as baby aspirin daily as well as patient takes omega 1200 mg. Patient has appointment with cardiologist today. Yes

## 2024-05-17 ENCOUNTER — EMERGENCY (EMERGENCY)
Facility: HOSPITAL | Age: 29
LOS: 1 days | Discharge: HOME | End: 2024-05-17
Attending: EMERGENCY MEDICINE | Admitting: EMERGENCY MEDICINE
Payer: COMMERCIAL

## 2024-05-17 VITALS
DIASTOLIC BLOOD PRESSURE: 88 MMHG | OXYGEN SATURATION: 100 % | TEMPERATURE: 98 F | SYSTOLIC BLOOD PRESSURE: 160 MMHG | HEART RATE: 89 BPM | RESPIRATION RATE: 16 BRPM

## 2024-05-17 DIAGNOSIS — N63 UNSPECIFIED LUMP IN BREAST: Chronic | ICD-10-CM

## 2024-05-17 LAB
ALBUMIN SERPL ELPH-MCNC: 3.8 G/DL — SIGNIFICANT CHANGE UP (ref 3.3–5)
ALP SERPL-CCNC: 45 U/L — SIGNIFICANT CHANGE UP (ref 40–120)
ALT FLD-CCNC: 12 U/L — SIGNIFICANT CHANGE UP (ref 4–33)
ANION GAP SERPL CALC-SCNC: 11 MMOL/L — SIGNIFICANT CHANGE UP (ref 7–14)
AST SERPL-CCNC: 26 U/L — SIGNIFICANT CHANGE UP (ref 4–32)
BILIRUB SERPL-MCNC: 0.4 MG/DL — SIGNIFICANT CHANGE UP (ref 0.2–1.2)
BUN SERPL-MCNC: 7 MG/DL — SIGNIFICANT CHANGE UP (ref 7–23)
CALCIUM SERPL-MCNC: 8.6 MG/DL — SIGNIFICANT CHANGE UP (ref 8.4–10.5)
CHLORIDE SERPL-SCNC: 106 MMOL/L — SIGNIFICANT CHANGE UP (ref 98–107)
CO2 SERPL-SCNC: 19 MMOL/L — LOW (ref 22–31)
CREAT SERPL-MCNC: 0.62 MG/DL — SIGNIFICANT CHANGE UP (ref 0.5–1.3)
D DIMER BLD IA.RAPID-MCNC: 352 NG/ML DDU — HIGH
EGFR: 124 ML/MIN/1.73M2 — SIGNIFICANT CHANGE UP
GLUCOSE SERPL-MCNC: 94 MG/DL — SIGNIFICANT CHANGE UP (ref 70–99)
HCT VFR BLD CALC: 36 % — SIGNIFICANT CHANGE UP (ref 34.5–45)
HGB BLD-MCNC: 11.9 G/DL — SIGNIFICANT CHANGE UP (ref 11.5–15.5)
MCHC RBC-ENTMCNC: 27.4 PG — SIGNIFICANT CHANGE UP (ref 27–34)
MCHC RBC-ENTMCNC: 33.1 GM/DL — SIGNIFICANT CHANGE UP (ref 32–36)
MCV RBC AUTO: 82.9 FL — SIGNIFICANT CHANGE UP (ref 80–100)
NRBC # BLD: 0 /100 WBCS — SIGNIFICANT CHANGE UP (ref 0–0)
NRBC # FLD: 0 K/UL — SIGNIFICANT CHANGE UP (ref 0–0)
PLATELET # BLD AUTO: 214 K/UL — SIGNIFICANT CHANGE UP (ref 150–400)
POTASSIUM SERPL-MCNC: 5.1 MMOL/L — SIGNIFICANT CHANGE UP (ref 3.5–5.3)
POTASSIUM SERPL-SCNC: 5.1 MMOL/L — SIGNIFICANT CHANGE UP (ref 3.5–5.3)
PROT SERPL-MCNC: 7.6 G/DL — SIGNIFICANT CHANGE UP (ref 6–8.3)
RBC # BLD: 4.34 M/UL — SIGNIFICANT CHANGE UP (ref 3.8–5.2)
RBC # FLD: 13.2 % — SIGNIFICANT CHANGE UP (ref 10.3–14.5)
SODIUM SERPL-SCNC: 136 MMOL/L — SIGNIFICANT CHANGE UP (ref 135–145)
WBC # BLD: 5.06 K/UL — SIGNIFICANT CHANGE UP (ref 3.8–10.5)
WBC # FLD AUTO: 5.06 K/UL — SIGNIFICANT CHANGE UP (ref 3.8–10.5)

## 2024-05-17 PROCEDURE — 93010 ELECTROCARDIOGRAM REPORT: CPT

## 2024-05-17 PROCEDURE — 99285 EMERGENCY DEPT VISIT HI MDM: CPT

## 2024-05-17 RX ORDER — ONDANSETRON 8 MG/1
4 TABLET, FILM COATED ORAL ONCE
Refills: 0 | Status: COMPLETED | OUTPATIENT
Start: 2024-05-17 | End: 2024-05-17

## 2024-05-17 RX ADMIN — ONDANSETRON 4 MILLIGRAM(S): 8 TABLET, FILM COATED ORAL at 12:46

## 2024-05-17 NOTE — ED PROVIDER NOTE - ATTENDING CONTRIBUTION TO CARE
I performed a face-to-face evaluation of the patient and performed a history and physical examination. I agree with the history and physical examination. If this was a PA visit, I personally saw the patient with the PA and performed a substantive portion of the visit including all aspects of the medical decision making.    Consider exposure to hydrocarbons. Check VBG, CBC, CMP. I independently interpreted the EKG. My interpretation of the EKG: No hyper-acute T waves, no malignant dysrhythmia, no ischemic ST segment changes. If unremarkable, then consider panic attack.

## 2024-05-17 NOTE — ED PROVIDER NOTE - CLINICAL SUMMARY MEDICAL DECISION MAKING FREE TEXT BOX
Sourav: Consider exposure to hydrocarbons. Check VBG, CBC, CMP. I independently interpreted the EKG. My interpretation of the EKG: No hyper-acute T waves, no malignant dysrhythmia, no ischemic ST segment changes. If unremarkable, then consider panic attack.
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
## 2024-05-17 NOTE — ED PROVIDER NOTE - PATIENT PORTAL LINK FT
You can access the FollowMyHealth Patient Portal offered by [facility] by registering at the following website: [email or web address]. By joining [employer]’s FollowMyHealth portal, you will also be able to view your health information using other applications (apps) compatible with our system.

## 2024-05-17 NOTE — ED PROVIDER NOTE - PROGRESS NOTE DETAILS
Sourav: Lab results unremarkable. D-dimer very mildly-elevated, not to the point where significant PE would be a concern, particularly in light of absence of CP and SOB and unremarkable EKG. Lizeth Lo PGY1: re-assessed patient, states she is feeling better then prior to arrival. hands are no longer clammy.

## 2024-05-17 NOTE — ED PROVIDER NOTE - NSFOLLOWUPINSTRUCTIONS_ED_ALL_ED_FT
You have been evaluated in the Emergency Department today for numbness/tingling in your arms, dry mouth and clammy hands. Your evaluation, including ekg and labs suggests that you do not require emergent intervention at this time.  Your EKG was normal.    Please follow up with your primary care physician within two days.    Return to the Emergency Department if you experience shortness of breath, trouble breathing, trouble swallowing, fevers greater 100.6 not relieved by tylenol, dizziness, or any worsening symptoms.
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
